# Patient Record
Sex: MALE | Race: WHITE | NOT HISPANIC OR LATINO | Employment: FULL TIME | URBAN - NONMETROPOLITAN AREA
[De-identification: names, ages, dates, MRNs, and addresses within clinical notes are randomized per-mention and may not be internally consistent; named-entity substitution may affect disease eponyms.]

---

## 2024-05-27 ENCOUNTER — HOSPITAL ENCOUNTER (EMERGENCY)
Facility: HOSPITAL | Age: 29
Discharge: PSYCHIATRIC OR SUBSTANCE USE FACILITY | End: 2024-05-28
Attending: EMERGENCY MEDICINE
Payer: COMMERCIAL

## 2024-05-27 DIAGNOSIS — F19.10 DRUG ABUSE (MULTI): ICD-10-CM

## 2024-05-27 DIAGNOSIS — F34.1 PERSISTENT DEPRESSIVE DISORDER: Primary | ICD-10-CM

## 2024-05-27 LAB
ALBUMIN SERPL BCP-MCNC: 4.8 G/DL (ref 3.4–5)
ALP SERPL-CCNC: 75 U/L (ref 33–120)
ALT SERPL W P-5'-P-CCNC: 15 U/L (ref 10–52)
AMPHETAMINES UR QL SCN: ABNORMAL
ANION GAP SERPL CALC-SCNC: 12 MMOL/L (ref 10–20)
APAP SERPL-MCNC: <10 UG/ML
APPEARANCE UR: ABNORMAL
AST SERPL W P-5'-P-CCNC: 14 U/L (ref 9–39)
BARBITURATES UR QL SCN: ABNORMAL
BASOPHILS # BLD AUTO: 0.03 X10*3/UL (ref 0–0.1)
BASOPHILS NFR BLD AUTO: 0.5 %
BENZODIAZ UR QL SCN: ABNORMAL
BILIRUB SERPL-MCNC: 0.2 MG/DL (ref 0–1.2)
BILIRUB UR STRIP.AUTO-MCNC: NEGATIVE MG/DL
BUN SERPL-MCNC: 14 MG/DL (ref 6–23)
BZE UR QL SCN: ABNORMAL
CALCIUM SERPL-MCNC: 9.6 MG/DL (ref 8.6–10.3)
CANNABINOIDS UR QL SCN: ABNORMAL
CHLORIDE SERPL-SCNC: 101 MMOL/L (ref 98–107)
CO2 SERPL-SCNC: 29 MMOL/L (ref 21–32)
COLOR UR: YELLOW
CREAT SERPL-MCNC: 1.19 MG/DL (ref 0.5–1.3)
EGFRCR SERPLBLD CKD-EPI 2021: 85 ML/MIN/1.73M*2
EOSINOPHIL # BLD AUTO: 0.08 X10*3/UL (ref 0–0.7)
EOSINOPHIL NFR BLD AUTO: 1.3 %
ERYTHROCYTE [DISTWIDTH] IN BLOOD BY AUTOMATED COUNT: 12.9 % (ref 11.5–14.5)
ETHANOL SERPL-MCNC: <10 MG/DL
FENTANYL+NORFENTANYL UR QL SCN: ABNORMAL
GLUCOSE SERPL-MCNC: 136 MG/DL (ref 74–99)
GLUCOSE UR STRIP.AUTO-MCNC: NEGATIVE MG/DL
HCT VFR BLD AUTO: 42.3 % (ref 41–52)
HGB BLD-MCNC: 14.1 G/DL (ref 13.5–17.5)
HOLD SPECIMEN: NORMAL
IMM GRANULOCYTES # BLD AUTO: 0.01 X10*3/UL (ref 0–0.7)
IMM GRANULOCYTES NFR BLD AUTO: 0.2 % (ref 0–0.9)
INR PPP: 1.1 (ref 0.9–1.1)
KETONES UR STRIP.AUTO-MCNC: NEGATIVE MG/DL
LACTATE SERPL-SCNC: 1.8 MMOL/L (ref 0.4–2)
LEUKOCYTE ESTERASE UR QL STRIP.AUTO: NEGATIVE
LYMPHOCYTES # BLD AUTO: 1.3 X10*3/UL (ref 1.2–4.8)
LYMPHOCYTES NFR BLD AUTO: 21.6 %
MCH RBC QN AUTO: 27.8 PG (ref 26–34)
MCHC RBC AUTO-ENTMCNC: 33.3 G/DL (ref 32–36)
MCV RBC AUTO: 83 FL (ref 80–100)
METHADONE UR QL SCN: ABNORMAL
MONOCYTES # BLD AUTO: 0.4 X10*3/UL (ref 0.1–1)
MONOCYTES NFR BLD AUTO: 6.6 %
NEUTROPHILS # BLD AUTO: 4.21 X10*3/UL (ref 1.2–7.7)
NEUTROPHILS NFR BLD AUTO: 69.8 %
NITRITE UR QL STRIP.AUTO: NEGATIVE
NRBC BLD-RTO: 0 /100 WBCS (ref 0–0)
OPIATES UR QL SCN: ABNORMAL
OXYCODONE+OXYMORPHONE UR QL SCN: ABNORMAL
PCP UR QL SCN: ABNORMAL
PH UR STRIP.AUTO: 5 [PH]
PLATELET # BLD AUTO: 229 X10*3/UL (ref 150–450)
POTASSIUM SERPL-SCNC: 3.8 MMOL/L (ref 3.5–5.3)
PROT SERPL-MCNC: 7.6 G/DL (ref 6.4–8.2)
PROT UR STRIP.AUTO-MCNC: NEGATIVE MG/DL
PROTHROMBIN TIME: 12.3 SECONDS (ref 9.8–12.8)
RBC # BLD AUTO: 5.08 X10*6/UL (ref 4.5–5.9)
RBC # UR STRIP.AUTO: NEGATIVE /UL
SALICYLATES SERPL-MCNC: <3 MG/DL
SARS-COV-2 RNA RESP QL NAA+PROBE: NOT DETECTED
SODIUM SERPL-SCNC: 138 MMOL/L (ref 136–145)
SP GR UR STRIP.AUTO: 1.03
UROBILINOGEN UR STRIP.AUTO-MCNC: 2 MG/DL
WBC # BLD AUTO: 6 X10*3/UL (ref 4.4–11.3)

## 2024-05-27 PROCEDURE — 80179 DRUG ASSAY SALICYLATE: CPT | Performed by: EMERGENCY MEDICINE

## 2024-05-27 PROCEDURE — 85610 PROTHROMBIN TIME: CPT | Performed by: EMERGENCY MEDICINE

## 2024-05-27 PROCEDURE — 82077 ASSAY SPEC XCP UR&BREATH IA: CPT | Performed by: EMERGENCY MEDICINE

## 2024-05-27 PROCEDURE — 80053 COMPREHEN METABOLIC PANEL: CPT | Performed by: EMERGENCY MEDICINE

## 2024-05-27 PROCEDURE — 80307 DRUG TEST PRSMV CHEM ANLYZR: CPT | Performed by: EMERGENCY MEDICINE

## 2024-05-27 PROCEDURE — 83605 ASSAY OF LACTIC ACID: CPT | Performed by: EMERGENCY MEDICINE

## 2024-05-27 PROCEDURE — 87635 SARS-COV-2 COVID-19 AMP PRB: CPT | Performed by: EMERGENCY MEDICINE

## 2024-05-27 PROCEDURE — 93005 ELECTROCARDIOGRAM TRACING: CPT

## 2024-05-27 PROCEDURE — 85025 COMPLETE CBC W/AUTO DIFF WBC: CPT | Performed by: EMERGENCY MEDICINE

## 2024-05-27 PROCEDURE — 99285 EMERGENCY DEPT VISIT HI MDM: CPT

## 2024-05-27 PROCEDURE — 81003 URINALYSIS AUTO W/O SCOPE: CPT | Mod: 59 | Performed by: EMERGENCY MEDICINE

## 2024-05-27 PROCEDURE — 80143 DRUG ASSAY ACETAMINOPHEN: CPT | Performed by: EMERGENCY MEDICINE

## 2024-05-27 PROCEDURE — 36415 COLL VENOUS BLD VENIPUNCTURE: CPT | Performed by: EMERGENCY MEDICINE

## 2024-05-27 SDOH — HEALTH STABILITY: MENTAL HEALTH: HAVE YOU EVER TRIED TO KILL YOURSELF?: NO

## 2024-05-27 SDOH — HEALTH STABILITY: MENTAL HEALTH: HAVE YOU EVER DONE ANYTHING, STARTED TO DO ANYTHING, OR PREPARED TO DO ANYTHING TO END YOUR LIFE?: NO

## 2024-05-27 SDOH — HEALTH STABILITY: MENTAL HEALTH: IN THE PAST FEW WEEKS, HAVE YOU FELT THAT YOU OR YOUR FAMILY WOULD BE BETTER OFF IF YOU WERE DEAD?: YES

## 2024-05-27 SDOH — HEALTH STABILITY: MENTAL HEALTH: WISH TO BE DEAD (PAST 1 MONTH): YES

## 2024-05-27 SDOH — HEALTH STABILITY: MENTAL HEALTH: ACTIVE SUICIDAL IDEATION WITH SOME INTENT TO ACT, WITHOUT SPECIFIC PLAN (PAST 1 MONTH): NO

## 2024-05-27 SDOH — HEALTH STABILITY: MENTAL HEALTH: HAVE YOU WISHED YOU WERE DEAD OR WISHED YOU COULD GO TO SLEEP AND NOT WAKE UP?: NO

## 2024-05-27 SDOH — HEALTH STABILITY: MENTAL HEALTH: IN THE PAST WEEK, HAVE YOU BEEN HAVING THOUGHTS ABOUT KILLING YOURSELF?: YES

## 2024-05-27 SDOH — HEALTH STABILITY: MENTAL HEALTH: SUICIDAL BEHAVIOR (LIFETIME): NO

## 2024-05-27 SDOH — HEALTH STABILITY: MENTAL HEALTH: ACTIVE SUICIDAL IDEATION WITH SPECIFIC PLAN AND INTENT (PAST 1 MONTH): NO

## 2024-05-27 SDOH — HEALTH STABILITY: MENTAL HEALTH: IN THE PAST FEW WEEKS, HAVE YOU WISHED YOU WERE DEAD?: YES

## 2024-05-27 SDOH — HEALTH STABILITY: MENTAL HEALTH: HAVE YOU ACTUALLY HAD ANY THOUGHTS OF KILLING YOURSELF?: NO

## 2024-05-27 SDOH — HEALTH STABILITY: MENTAL HEALTH
DEPRESSION SYMPTOMS: APPETITE CHANGE;FEELINGS OF HELPLESSNESS;FEELINGS OF HOPELESSESS;FEELINGS OF WORTHLESSNESS;LOSS OF INTEREST;ISOLATIVE

## 2024-05-27 SDOH — ECONOMIC STABILITY: HOUSING INSECURITY: FEELS SAFE LIVING IN HOME: YES

## 2024-05-27 SDOH — HEALTH STABILITY: MENTAL HEALTH: NON-SPECIFIC ACTIVE SUICIDAL THOUGHTS (PAST 1 MONTH): YES

## 2024-05-27 SDOH — HEALTH STABILITY: MENTAL HEALTH: ANXIETY SYMPTOMS: NO PROBLEMS REPORTED OR OBSERVED.

## 2024-05-27 SDOH — HEALTH STABILITY: MENTAL HEALTH: BEHAVIORS/MOOD: WITHDRAWN

## 2024-05-27 SDOH — HEALTH STABILITY: MENTAL HEALTH: DESCRIBE YOUR THOUGHTS OF KILLING YOURSELF RIGHT NOW:: JUST HAVING THOUGHTS BUT NO PLAN OR INTENT

## 2024-05-27 SDOH — HEALTH STABILITY: MENTAL HEALTH: SUICIDE ASSESSMENT: ADULT (C-SSRS)

## 2024-05-27 SDOH — HEALTH STABILITY: MENTAL HEALTH: ARE YOU HAVING THOUGHTS OF KILLING YOURSELF RIGHT NOW?: YES

## 2024-05-27 ASSESSMENT — LIFESTYLE VARIABLES
SUBSTANCE_ABUSE_PAST_12_MONTHS: YES
PRESCIPTION_ABUSE_PAST_12_MONTHS: NO

## 2024-05-27 ASSESSMENT — PAIN - FUNCTIONAL ASSESSMENT: PAIN_FUNCTIONAL_ASSESSMENT: 0-10

## 2024-05-27 ASSESSMENT — PAIN SCALES - GENERAL: PAINLEVEL_OUTOF10: 0 - NO PAIN

## 2024-05-27 ASSESSMENT — COLUMBIA-SUICIDE SEVERITY RATING SCALE - C-SSRS
2. HAVE YOU ACTUALLY HAD ANY THOUGHTS OF KILLING YOURSELF?: NO
6. HAVE YOU EVER DONE ANYTHING, STARTED TO DO ANYTHING, OR PREPARED TO DO ANYTHING TO END YOUR LIFE?: NO
1. IN THE PAST MONTH, HAVE YOU WISHED YOU WERE DEAD OR WISHED YOU COULD GO TO SLEEP AND NOT WAKE UP?: NO

## 2024-05-27 NOTE — ED PROVIDER NOTES
" [facility]   ED  Provider Note  5/27/2024  7:22 PM  AC09/AC09      Chief Complaint   Patient presents with    Suicidal    Psychiatric Evaluation     Pt to ED for girlfriend's concern that he will hurt himself. Pt withdrawn on arrival, states he has been \"in a dark place.\" Denies SI/HI. States hx of self harm cutting at age of 11. Hx of street drug use. Past heroine use. Recently relapsed on meth about 1 week ago. Pt has 9 month son at home hat he \"wants to be better or' along with a girlfriend that he feel like he hs let down. States his depressive episode has gotten worse over the last year. Pt states \" I sometimes feel like people would be better         History of Present Illness:   Inna Sal is a 28 y.o. male presenting to the ED for pression, beginning several months ago and recurrent throughout his teenage and adult life.  The complaint has been recently persistent, moderate in severity, and worsened by a poor relationship with his girlfriend.  Patient began cutting his forearms when he was 10 years old.  He has had multiple bouts of depression since.  He has been quite depressed over the past month or longer.  He does use methamphetamine and has been on heroin in the remote past.  His last methamphetamine use was 3 days ago.  He denies alcohol abuse.  He denies any fixed suicide plan at this time.  He has thought about killing himself.  He has not seen a counselor or psychiatrist in many years.      Review of Systems:   Pertinent positives and review of systems as noted above.  Remaining 10 review of systems is negative or noncontributory to today's episode of care.  Review of Systems       --------------------------------------------- PAST HISTORY ---------------------------------------------  Past Medical History: History reviewed. No pertinent past medical history.     Past Surgical History: History reviewed. No pertinent surgical history.     Social History:   Social History     Social History " Narrative    Not on file        Family History: family history is not on file. Unless otherwise noted, family history is non contributory    There are no discharge medications for this patient.     The patient’s home medications have been reviewed.    Allergies: Patient has no known allergies.    -------------------------------------------------- RESULTS -------------------------------------------------  All laboratory and radiology results have been personally reviewed by myself   LABS:  Labs Reviewed   DRUG SCREEN,URINE - Abnormal       Result Value    Amphetamine Screen, Urine Presumptive Positive (*)     Barbiturate Screen, Urine Presumptive Negative      Benzodiazepines Screen, Urine Presumptive Negative      Cannabinoid Screen, Urine Presumptive Negative      Cocaine Metabolite Screen, Urine Presumptive Negative      Fentanyl Screen, Urine Presumptive Negative      Opiate Screen, Urine Presumptive Negative      Oxycodone Screen, Urine Presumptive Negative      PCP Screen, Urine Presumptive Negative      Methadone Screen, Urine Presumptive Negative      Narrative:     Drug screen results are presumptive and should not be used to assess   compliance with prescribed medication. Contact the performing [facility] laboratory   to add-on definitive confirmatory testing if clinically indicated.    Toxicology screening results are reported qualitatively. The concentration must   be greater than or equal to the cutoff to be reported as positive. The concentration   at which the screening test can detect an individual drug or metabolite varies.   The absence of expected drug(s) and/or drug metabolite(s) may indicate non-compliance,   inappropriate timing of specimen collection relative to drug administration, poor drug   absorption, diluted/adulterated urine, or limitations of testing. For medical purposes   only; not valid for forensic use.    Interpretive questions should be directed to the laboratory medical directors.    COMPREHENSIVE METABOLIC PANEL - Abnormal    Glucose 136 (*)     Sodium 138      Potassium 3.8      Chloride 101      Bicarbonate 29      Anion Gap 12      Urea Nitrogen 14      Creatinine 1.19      eGFR 85      Calcium 9.6      Albumin 4.8      Alkaline Phosphatase 75      Total Protein 7.6      AST 14      Bilirubin, Total 0.2      ALT 15     URINALYSIS WITH REFLEX MICROSCOPIC - Abnormal    Color, Urine Yellow      Appearance, Urine Hazy (*)     Specific Gravity, Urine 1.029      pH, Urine 5.0      Protein, Urine NEGATIVE      Glucose, Urine NEGATIVE      Blood, Urine NEGATIVE      Ketones, Urine NEGATIVE      Bilirubin, Urine NEGATIVE      Urobilinogen, Urine 2.0 (*)     Nitrite, Urine NEGATIVE      Leukocyte Esterase, Urine NEGATIVE     ALCOHOL - Normal    Alcohol <10     PROTIME-INR - Normal    Protime 12.3      INR 1.1     LACTATE - Normal    Lactate 1.8      Narrative:     Venipuncture immediately after or during the administration of Metamizole may lead to falsely low results. Testing should be performed immediately  prior to Metamizole dosing.   SARS-COV-2 PCR - Normal    Coronavirus 2019, PCR Not Detected      Narrative:     This assay has received FDA Emergency Use Authorization (EUA) and is only authorized for the duration of time that circumstances exist to justify the authorization of the emergency use of in vitro diagnostic tests for the detection of SARS-CoV-2 virus and/or diagnosis of COVID-19 infection under section 564(b)(1) of the Act, 21 U.S.C. 360bbb-3(b)(1). This assay is an in vitro diagnostic nucleic acid amplification test for the qualitative detection of SARS-CoV-2 from nasopharyngeal specimens and has been validated for use at [facility]. Negative results do not preclude COVID-19 infections and should not be used as the sole basis for diagnosis, treatment, or other management decisions.     SALICYLATE - Normal    Salicylate  <3     ACETAMINOPHEN - Normal     "Acetaminophen <10.0     CBC WITH AUTO DIFFERENTIAL    WBC 6.0      nRBC 0.0      RBC 5.08      Hemoglobin 14.1      Hematocrit 42.3      MCV 83      MCH 27.8      MCHC 33.3      RDW 12.9      Platelets 229      Neutrophils % 69.8      Immature Granulocytes %, Automated 0.2      Lymphocytes % 21.6      Monocytes % 6.6      Eosinophils % 1.3      Basophils % 0.5      Neutrophils Absolute 4.21      Immature Granulocytes Absolute, Automated 0.01      Lymphocytes Absolute 1.30      Monocytes Absolute 0.40      Eosinophils Absolute 0.08      Basophils Absolute 0.03       EKG:    RADIOLOGY:  Interpreted by Radiologist.  No orders to display       Encounter Date: 05/27/24   ECG 12 Lead   Result Value    Ventricular Rate 107    Atrial Rate 107    SC Interval 156    QRS Duration 98    QT Interval 340    QTC Calculation(Bazett) 453    P Axis 75    R Axis 23    T Axis 46    QRS Count 18    Q Onset 219    P Onset 141    P Offset 200    T Offset 389    QTC Fredericia 412    Narrative    Sinus tachycardia  Possible Left atrial enlargement  Incomplete right bundle branch block  Borderline ECG  When compared with ECG of 01-AUG-1995 14:54,  PREVIOUS ECG IS PRESENT  See ED provider note for full interpretation and clinical correlation  Confirmed by Sona Sanchez ([id number]) on 5/28/2024 11:18:07 AM     ------------------------- NURSING NOTES AND VITALS REVIEWED ---------------------------   The nursing notes within the ED encounter and vital signs as below have been reviewed.   /85   Pulse 98   Temp 37 °C (98.6 °F)   Resp 18   Ht 1.676 m (5' 6\")   Wt 74.8 kg (165 lb)   SpO2 100%   BMI 26.63 kg/m²   Oxygen Saturation Interpretation: Normal      ---------------------------------------------------PHYSICAL EXAM--------------------------------------  Physical Exam   Constitutional/General: Alert,  well appearing, non toxic in NAD  Head: Normocephalic and atraumatic  Eyes: PERRL, EOMI, conjunctiva normal, sclera non icteric  Mouth: " Oropharynx clear, handling secretions, no trismus, no asymmetry of the posterior oropharynx or uvular edema  Neck: Supple, full ROM, non tender to palpation in the midline, no stridor, no crepitus, no meningeal signs  Respiratory: Lungs clear to auscultation bilaterally, no wheezes, rales, or rhonchi. Not in respiratory distress  Cardiovascular:  Regular rate. Regular rhythm. No murmurs, gallops, or rubs. 2+ distal pulses  Chest: No chest wall tenderness  GI:  Abdomen Soft, Non tender, Non distended.  +BS. No organomegaly, no palpable masses,  No rebound, guarding, or rigidity.   Musculoskeletal: Moves all extremities x 4. Warm and well perfused, no clubbing, cyanosis, or edema. Capillary refill <3 seconds  Integument: skin warm and dry. No rashes.  Old scars from remote self injury to his forearms  Lymphatic: no lymphadenopathy noted  Neurologic: No focal deficits, symmetric strength 5/5 in the upper and lower extremities bilaterally  Psychiatric: Normal Affect    Procedures    ------------------------------ ED COURSE/MEDICAL DECISION MAKING----------------------  ED Course as of 05/30/24 1048   Mon May 27, 2024   2019 EKG done at 2013 which I reviewed and independently interpreted reveals a sinus tachycardia rate of 107 with incomplete right bundle branch block, normal axis, normal ST and T waves, normal QT/QTc.  No STEMI. [RM]      ED Course User Index  [RM] Bryce Nixon MD         Diagnoses as of 05/30/24 1048   Persistent depressive disorder   Drug abuse (Multi)      Patient require medical clearance and EPAT evaluation possible placement versus outpatient counseling.      Medical Decision Making:   Patient sent to Dr. Nixon pending medical clearance and psychiatric evaluation.    ED Course as of 05/30/24 1048   Mon May 27, 2024   2019 EKG done at 2013 which I reviewed and independently interpreted reveals a sinus tachycardia rate of 107 with incomplete right bundle branch block, normal axis, normal ST  and T waves, normal QT/QTc.  No STEMI. [RM]      ED Course User Index  [RM] Bryce Nixon MD         Diagnoses as of 05/30/24 1048   Persistent depressive disorder   Drug abuse (Multi)      Counseling:   The emergency provider has spoken with the patient and discussed today’s results, in addition to providing specific details for the plan of care and counseling regarding the diagnosis and prognosis.  Questions are answered at this time and they are agreeable with the plan.      --------------------------------- IMPRESSION AND DISPOSITION ---------------------------------        IMPRESSION  1. Persistent depressive disorder    2. Drug abuse (Multi)        DISPOSITION  Disposition: See ED record  Patient condition is fair      Billing Provider Critical Care Time: 0 minutes     Agustin Robles MD  05/27/24 1959       Agustin Robles MD  05/30/24 1048

## 2024-05-28 ENCOUNTER — APPOINTMENT (OUTPATIENT)
Dept: CARDIOLOGY | Facility: HOSPITAL | Age: 29
End: 2024-05-28
Payer: COMMERCIAL

## 2024-05-28 VITALS
TEMPERATURE: 98.6 F | DIASTOLIC BLOOD PRESSURE: 85 MMHG | HEIGHT: 66 IN | BODY MASS INDEX: 26.52 KG/M2 | WEIGHT: 165 LBS | RESPIRATION RATE: 18 BRPM | OXYGEN SATURATION: 100 % | SYSTOLIC BLOOD PRESSURE: 136 MMHG | HEART RATE: 98 BPM

## 2024-05-28 LAB
ATRIAL RATE: 107 BPM
P AXIS: 75 DEGREES
P OFFSET: 200 MS
P ONSET: 141 MS
PR INTERVAL: 156 MS
Q ONSET: 219 MS
QRS COUNT: 18 BEATS
QRS DURATION: 98 MS
QT INTERVAL: 340 MS
QTC CALCULATION(BAZETT): 453 MS
QTC FREDERICIA: 412 MS
R AXIS: 23 DEGREES
T AXIS: 46 DEGREES
T OFFSET: 389 MS
VENTRICULAR RATE: 107 BPM

## 2024-05-28 NOTE — SIGNIFICANT EVENT
Application for Emergency Admission      Ready for Transfer?  Is the patient medically cleared for transfer to inpatient psychiatry: Yes  Has the patient been accepted to an inpatient psychiatric hospital: Yes    Application for Emergency Admission  IN ACCORDANCE [place]122[place]  The Chief Clinical Officer of: [place] 5/28/2024 .2:35 AM    Reason for Hospitalization  The undersigned has reason to believe that: Inna Sal Is a mentally ill person subject to hospitalization by court order under division B Section 5122.01 of the Revised Code, i.e., this person:    1.Yes  Represents a substantial risk of physical harm to self as manifested by evidence of threats of, or attempts at, suicide or serious self-inflicted bodily harm    2.No Represents a substantial risk of physical harm to others as manifested by evidence of recent homicidal or other violent behavior, evidence of recent threats that place another in reasonable fear of violent behavior and serious physical harm, or other evidence of present dangerousness    3.No Represents a substantial and immediate risk of serious physical impairment or injury to self as manifested by  evidence that the person is unable to provide for and is not providing for the person's basic physical needs because of the person's mental illness and that appropriate provision for those needs cannot be made  immediately available in the community    4.Yes Would benefit from treatment in a hospital for his mental illness and is in need of such treatment as manifested by evidence of behavior that creates a grave and imminent risk to substantial rights of others or  himself.    5.No Would benefit from treatment as manifested by evidence of behavior that indicates all of the following:       (a) The person is unlikely to survive safely in the community without supervision, based on a clinical determination.       (b) The person has a history of lack of compliance with  treatment for mental illness and one of the following applies:      (i) At least twice within the thirty-six months prior to the filing of an affidavit seeking court-ordered treatment of the person under section 5122.111 of the Revised Code, the lack of compliance has been a significant factor in necessitating hospitalization in a hospital or receipt of services in a forensic or other mental health unit of a correctional facility, provided that the thirty-six-month period shall be extended by the length of any hospitalization or incarceration of the person that occurred within the thirty-six-month period.      (ii) Within the forty-eight months prior to the filing of an affidavit seeking court-ordered treatment of the person under section 5122.111 of the Revised Code, the lack of compliance resulted in one or more acts of serious violent behavior toward self or others or threats of, or attempts at, serious physical harm to self or others, provided that the forty-eight-month period shall be extended by the length of any hospitalization or incarceration of the person that occurred within the forty-eight-month period.      (c) The person, as a result of mental illness, is unlikely to voluntarily participate in necessary treatment.       (d) In view of the person's treatment history and current behavior, the person is in need of treatment in order to prevent a relapse or deterioration that would be likely to result in substantial risk of serious harm to the person or others.    (e) Represents a substantial risk of physical harm to self or others if allowed to remain at liberty pending examination.    Therefore, it is requested that said person be admitted to the above named facility.    STATEMENT OF BELIEF    Must be filled out by one of the following: a psychiatrist, licensed physician, licensed clinical psychologist, health or ,  or .  (Statement shall include the circumstances under  which the individual was taken into custody and the reason for the person's belief that hospitalization is necessary. The statement shall also include a reference to efforts made to secure the individual's property at his residence if he was taken into custody there. Every reasonable and appropriate effort should be made to take this person into custody in the least conspicuous manner possible.)    After argument with girlfriend patient threatened to hang himself.  Girlfriend called police who found a bucket in the bedroom that looked like someone had stepped on and broken.  Family concerned of patient's increased depression and deteriorating condition.  The patient's behavior is concerning for imminent risk of self-harm.    Bryce Nixon MD 5/28/2024     _____________________________________________________________   Place of Employment: [facility]    STATEMENT OF OBSERVATION BY PSYCHIATRIST, LICENSED PHYSICIAN, OR LICENSED CLINICAL PSYCHOLOGIST, IF APPLICABLE    Place of Observation (e.g., [facility] mental health center, general hospital, office, emergency facility)  (If applicable, please complete)    Bryce Nixon MD 5/28/2024    _____________________________________________________________

## 2024-05-28 NOTE — PROGRESS NOTES
"Emergency Medicine Transition of Care Note.    I received Inna Sal in signout from Dr. Robles.  Please see the previous ED provider note for all HPI, PE and MDM up to the time of signout at 8 PM. This is in addition to the primary record.    In brief Inna Sal is an 28 y.o. male presenting for increased depression.  Chief Complaint   Patient presents with    Suicidal    Psychiatric Evaluation     Pt to ED for girlfriend's concern that he will hurt himself. Pt withdrawn on arrival, states he has been \"in a dark place.\" Denies SI/HI. States hx of self harm cutting at age of 11. Hx of street drug use. Past heroine use. Recently relapsed on meth about 1 week ago. Pt has 9 month son at home hat he \"wants to be better or' along with a girlfriend that he feel like he hs let down. States his depressive episode has gotten worse over the last year. Pt states \" I sometimes feel like people would be better      At the time of signout we were awaiting: Blood work and urine.    ED Course as of 05/27/24 2132   Mon May 27, 2024   2019 EKG done at 2013 which I reviewed and independently interpreted reveals a sinus tachycardia rate of 107 with incomplete right bundle branch block, normal axis, normal ST and T waves, normal QT/QTc.  No STEMI. [RM]      ED Course User Index  [RM] Bryce Nixon MD         Diagnoses as of 05/27/24 2132   Persistent depressive disorder   Drug abuse (Multi)       Medical Decision Making    Labs Reviewed   DRUG SCREEN,URINE - Abnormal       Result Value    Amphetamine Screen, Urine Presumptive Positive (*)     Barbiturate Screen, Urine Presumptive Negative      Benzodiazepines Screen, Urine Presumptive Negative      Cannabinoid Screen, Urine Presumptive Negative      Cocaine Metabolite Screen, Urine Presumptive Negative      Fentanyl Screen, Urine Presumptive Negative      Opiate Screen, Urine Presumptive Negative      Oxycodone Screen, Urine Presumptive Negative      PCP Screen, Urine " Presumptive Negative      Methadone Screen, Urine Presumptive Negative      Narrative:     Drug screen results are presumptive and should not be used to assess   compliance with prescribed medication. Contact the performing [facility] laboratory   to add-on definitive confirmatory testing if clinically indicated.    Toxicology screening results are reported qualitatively. The concentration must   be greater than or equal to the cutoff to be reported as positive. The concentration   at which the screening test can detect an individual drug or metabolite varies.   The absence of expected drug(s) and/or drug metabolite(s) may indicate non-compliance,   inappropriate timing of specimen collection relative to drug administration, poor drug   absorption, diluted/adulterated urine, or limitations of testing. For medical purposes   only; not valid for forensic use.    Interpretive questions should be directed to the laboratory medical directors.   COMPREHENSIVE METABOLIC PANEL - Abnormal    Glucose 136 (*)     Sodium 138      Potassium 3.8      Chloride 101      Bicarbonate 29      Anion Gap 12      Urea Nitrogen 14      Creatinine 1.19      eGFR 85      Calcium 9.6      Albumin 4.8      Alkaline Phosphatase 75      Total Protein 7.6      AST 14      Bilirubin, Total 0.2      ALT 15     URINALYSIS WITH REFLEX MICROSCOPIC - Abnormal    Color, Urine Yellow      Appearance, Urine Hazy (*)     Specific Gravity, Urine 1.029      pH, Urine 5.0      Protein, Urine NEGATIVE      Glucose, Urine NEGATIVE      Blood, Urine NEGATIVE      Ketones, Urine NEGATIVE      Bilirubin, Urine NEGATIVE      Urobilinogen, Urine 2.0 (*)     Nitrite, Urine NEGATIVE      Leukocyte Esterase, Urine NEGATIVE     ALCOHOL - Normal    Alcohol <10     PROTIME-INR - Normal    Protime 12.3      INR 1.1     LACTATE - Normal    Lactate 1.8      Narrative:     Venipuncture immediately after or during the administration of Metamizole may lead to falsely low results.  Testing should be performed immediately  prior to Metamizole dosing.   SARS-COV-2 PCR - Normal    Coronavirus 2019, PCR Not Detected      Narrative:     This assay has received FDA Emergency Use Authorization (EUA) and is only authorized for the duration of time that circumstances exist to justify the authorization of the emergency use of in vitro diagnostic tests for the detection of SARS-CoV-2 virus and/or diagnosis of COVID-19 infection under section 564(b)(1) of the Act, 21 U.S.C. 360bbb-3(b)(1). This assay is an in vitro diagnostic nucleic acid amplification test for the qualitative detection of SARS-CoV-2 from nasopharyngeal specimens and has been validated for use at . Negative results do not preclude COVID-19 infections and should not be used as the sole basis for diagnosis, treatment, or other management decisions.     SALICYLATE - Normal    Salicylate  <3     ACETAMINOPHEN - Normal    Acetaminophen <10.0     CBC WITH AUTO DIFFERENTIAL    WBC 6.0      nRBC 0.0      RBC 5.08      Hemoglobin 14.1      Hematocrit 42.3      MCV 83      MCH 27.8      MCHC 33.3      RDW 12.9      Platelets 229      Neutrophils % 69.8      Immature Granulocytes %, Automated 0.2      Lymphocytes % 21.6      Monocytes % 6.6      Eosinophils % 1.3      Basophils % 0.5      Neutrophils Absolute 4.21      Immature Granulocytes Absolute, Automated 0.01      Lymphocytes Absolute 1.30      Monocytes Absolute 0.40      Eosinophils Absolute 0.08      Basophils Absolute 0.03       No orders to display     Patient presents to the emergency department for evaluation for increased depression.  Drawstring positive for amphetamines.  Alcohol level is undetectable.  Negative Tylenol and aspirin levels.  Rest of blood work is all within reasonable range including electrolytes and CBC.  I reviewed and independently interpreted all blood work.  Patient is medically cleared for EPAT evaluation.  Patient has been  evaluated by YOU.  I had spoken with the patient.  He denies any suicidal or homicidal thoughts at present time and wants to leave.  I have reviewed YOU's note.  I have reviewed their conversation with the family members that served as collateral.  Everyone is concerned that the patient's condition has been declining and he is a risk to himself.  There was a broken bucket found in the bedroom that look like someone had tried to stand on and that further raises concern that he did try to stand on the bucket may be too try to hang himself.  The patient will be pink slipped.  Recommendation from YOU for admission.  The patient will be held in the emergency department for admission.  Patient has been accepted to [facility] by hospitalist Kathy Suh  Final diagnoses:   [F34.1] Persistent depressive disorder   [F19.10] Drug abuse (Multi)           Procedure  Procedures    Bryce Nixon MD

## 2024-05-28 NOTE — PROGRESS NOTES
EPAT - Social Work Psychiatric Assessment    Arrival Details  Mode of Arrival: Ambulance  Admission Source: Emergency department  Admission Type: Voluntary, Involuntary  EPAT Assessment Start Date: 05/27/24  EPAT Assessment Start Time: 2240  Name of : Marlene Lopez M.Ed., [facility]    History of Present Illness  Admission Reason: Suicidal Ideation    The patient is a 28 yr old male with a history of depression and severe polysubstance use disorder (methamphetamine/jessica meth and opioids/heroin). The patient presents in the ED with concerns of suicidal ideation and recent relapse on methamphetamine. According to ED notes, the patient's depression has worsened due to a poor relationship with his girlfriend. He recently relapsed on methamphetamine and last use was 3 days ago. He endorses suicidal ideations but has no plan or intent of acting on them. The patient scores low risk for suicide on the C-SSRS. He has not seen any psychiatrist or counselor in many years. The patient has a 9 month old son at home and “wants to be better”.    Patient history was reviewed before EPAT evaluation.     Psychiatric Diagnosis History: depression and severe polysubstance use disorder (methamphetamine/Jessica meth and opioids/heroin)    Psychiatric Medication/Treatment History: Patient has not had any psychiatric treatment in over 5+ years.    SW Readmission Information   Readmission within 30 Days: No    Psychiatric Symptoms  Anxiety Symptoms: No problems reported or observed.  Depression Symptoms: Appetite change, Feelings of helplessness, Feelings of hopelessess, Feelings of worthlessness, Loss of interest, Isolative  Sarah Symptoms: No problems reported or observed.    Psychosis Symptoms  Hallucination Type: No problems reported or observed.  Delusion Type: No problems reported or observed.    Additional Symptoms - Adult  Generalized Anxiety Disorder: No problems reported or observed.  Obsessive Compulsive Disorder: No  "problems reported or observed.  Panic Attack: No problems reported or observed.  Post Traumatic Stress Disorder: No problems reported or observed.  Delirium: No problems reported or observed.  Review of Symptoms Comments: The patient reports that within the past year his depressive symptoms have been worsening due to a stressed/ariella relationship with his girlfriend. He reports feeling like has been on \"autopilot\" and nothing is getting better. He has been having suicidal thoughts and told his girlfriend he was going to hang himself but is denying any plan/intent. He has a 9 month old son with his girlfriend. He recently relapsed after being sober for a few years. He has not had any treatment in years.    Past Psychiatric History/Meds/Treatments  Past Psychiatric History: depression and severe polysubstance use disorder (methamphetamine/ciro meth and opioids/heroin)  Past Psychiatric Meds/Treatments: None. Patient has [facility] substance use treatment [facility] and [facility]  Past Violence/Victimization History: None reported    Current Mental Health Contacts  Provider Name/Phone Number: Fransisco España MD  Provider Last Appointment Date: Unknown    Support System: Immediate family, Extended family, Friends, Community    Living Arrangement: House, Lives with someone (Lives with girlfriend and son)    Home Safety  Feels Safe Living in Home: Yes    Income Information  Employment Status for: Patient  Employment Status: Employed  Income Source: Employed  Current/Previous Occupation: Service Industry (Maintenance)    [place] Service/Education History  Current or Previous  Service: None    Social/Cultural History  Social History: 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  Cultural Requests During Hospitalization: None  Spiritual Requests During Hospitalization: None  Important Activities: Family, Social    Legal  Legal Considerations:  (None reported)  Assistance with Managing/Advocating " Healthcare Needs:  (None reported)  Criminal Activity/ Legal Involvement Pertinent to Current Situation/ Hospitalization: None  Legal Concerns: None  Legal Comments: None    Drug Screening  Have you used any substances (canabis, cocaine, heroin, hallucinogens, inhalants, etc.) in the past 12 months?: Yes (Methamphetamine)  Have you used any prescription drugs other than prescribed in the past 12 months?: No  Is a toxicology screen needed?: Yes    Stage of Change  Stage of Change: Precontemplation  History of Treatment: Inpatient, AA/NA meetings  Type of Treatment Offered: Other (Comment) ([facility])  Treatment Offered: Declined  Duration of Substance Use: Patient has had about 3 relapses over the years  Frequency of Substance Use: Unknown  Age of First Substance Use: Unknown    Psychosocial  Psychosocial (WDL): Exceptions to WDL  Behaviors/Mood: Cooperative, Guarded, Withdrawn  Affect: Appropriate to circumstances  Parent/Guardian/Significant Other Involvement: No involvement  Family Behaviors: Appropriate for situation  Visitor Behaviors: Appropriate for situation, Supportive  Needs Expressed: Emotional  Emotional Support Given: Patient counseling    Orientation  Orientation Level: Oriented X4    General Appearance  Motor Activity: Unremarkable  Speech Pattern:  (Unremarkable)  General Attitude: Cooperative, Guarded, Withdrawn  Appearance/Hygiene: Unremarkable    Thought Process  Coherency:  (Patient is coherent)  Content: Unremarkable  Delusions:  (No delusions reported or observed)  Perception: Not altered  Hallucination: None  Judgment/Insight: Limited  Confusion: None  Cognition: Follows commands    Sleep Pattern  Sleep Pattern: Sleeps all night    Risk Factors  Self Harm/Suicidal Ideation Plan: Patient denies but told girlfriend he was going to hang himself.  Previous Self Harm/Suicidal Plans: Patient denies  Risk Factors: Substance abuse  Description of Thoughts/Ideas Leaving Unit Now: Unable to  assess    Violence Risk Assessment  Assessment of Violence: On admission  Thoughts of Harm to Others: No    Ability to Assess Risk Screen  Risk Screen - Ability to Assess: Able to be screened  Ask Suicide-Screening Questions  1. In the past few weeks, have you wished you were dead?: Yes  2. In the past few weeks, have you felt that you or your family would be better off if you were dead?: Yes  3. In the past week, have you been having thoughts about killing yourself?: Yes  4. Have you ever tried to kill yourself?: No  5. Are you having thoughts of killing yourself right now?: Yes  Describe your thoughts of killing yourself right now:: Just having thoughts but no plan or intent  Calculated Risk Score: Imminent Risk  [place] Suicide Severity Rating Scale (Screener/Recent Self-Report)  1. Wish to be Dead (Past 1 Month): Yes  2. Non-Specific Active Suicidal Thoughts (Past 1 Month): Yes  3. Active Suicidal Ideation with any Methods (Not Plan) Without Intent to Act (Past 1 Month): Yes  4. Active Suicidal Ideation with Some Intent to Act, Without Specific Plan (Past 1 Month): No  5. Active Suicidal Ideation with Specific Plan and Intent (Past 1 Month): No  6. Suicidal Behavior (Lifetime): No  Calculated C-SSRS Risk Score (Lifetime/Recent): Moderate Risk  Step 1: Risk Factors  Current & Past Psychiatric Dx: Other (Comment) (Untreated depression)  Presenting Symptoms: Anhedonia, Impulsivity, Hopelessness or despair  Family History: Other (Comment) (Brother has substance use issues also)  Precipitants/Stressors: Triggering events leading to humiliation, shame, and/or despair (e.g. loss of relationship, financial or health status) (real or anticipated), Substance intoxication or withdrawal, Perceived burden on others  Change in Treatment: Non-compliant or not receiving treatment  Access to Lethal Methods : No  Step 2: Protective Factors   Protective Factors Internal: Identifies reasons for living  Protective Factors External:  Responsibility to children, Supportive social network or family or friends, Engaged in work or school  Step 3: Suicidal Ideation Intensity  Most Severe Suicidal Ideation Identified: Patient claims he's only having thoughts but did tell girlfriend he was going to hang himself. (Brother reports a broken bucket was found upstairs and they don't know if he tried standing on it in an attempt to hang himself.)  How Many Times Have You Had These Thoughts: 2-5 times in a week  When You Have the Thoughts How Long do They Last : 1-4 hours/a lot of the time  Could/Can You Stop Thinking About Killing Yourself or Wanting to Die if You Want to: Can control thoughts with a lot of difficulty  Are There Things - Anyone or Anything - That Stopped You From Wanting to Die or Acting on: Uncertain that deterrents stopped you  What Sort of Reasons Did You Have For Thinking About Wanting to Die or Killing Yourself: Equally to get attention, revenge or a reaction from others and to end/stop the pain  Total Score: 16  Step 5: Documentation  Risk Level: Moderate suicide risk    Psychiatric Impression and Plan of Care    The patient is a 28 yr old male with a history of depression and severe polysubstance use disorder (methamphetamine/ciro meth and opioids/heroin). The patient presents for a psychiatric evaluation with concerns of suicidal ideation and recent relapse on methamphetamine. The patient appears cooperative but also withdrawn and guarded.     He endorses that he relapsed on methamphetamine a few days ago. [facility] use treatment [facility], he declined and said, “No. I'll be fine. I might go to an AA/NA meeting.”     The patient denies any HI/AH/VH. The patient endorses suicidal thoughts but has no plan or intent with acting on those thoughts. However, he did tell his girlfriend on a phone call that he was going to hang himself. She then hung up and called 911. The patient  was reportedly found sitting on the couch but a broken bucket was found upstairs and it is unknown if the patient tried standing on it in an attempt to actually hang himself. This writer asked the patient if he and his girlfriend were fighting when he told her he was going to hang himself. He answered indecisively with, “Umm. I don't know. Not really. Well… maybe a little.” It was difficult to know if the patient made the statement “I'm going to hang myself” in a threatening manner without intent due to an argument and trying to get her attention or if he had called to actually tell her that he was going to hang himself. The patient's answers did not clarify this and he also refused to give any contact info to ED, such as his girlfriend's number. This writer was able to convince the patient to allow him to speak with his brother who was at the ED with the patient in the waiting area.     Collateral: The patient's brother reports that the patient “has not been himself” for a while now and he seems to be declining quickly. He reports that “all I know is his girlfriend called me saying that he said he was going to hang himself and police said they found him on the couch but there was a broken bucket upstairs that looks like someone tried to stand on it. They don't know if he attempted to hang himself by standing on the bucket or not”. The patient's brother is worried about the patient's wellbeing. He said, “I told him I'm going to a meeting tomorrow morning and I hope he comes with me but I can't make that decision for him”.       The patient scores moderate risk for suicide on the C-SSRS when assessed by EPAT. Since the patient would not give the number of his girlfriend to ED for collateral information, this writer feels that the patient may be at a higher risk for suicide then what he presents himself. Since the whole story was unable to be obtained from the patient's girlfriend's perspective, it is assumed that the  patient had a plan to hang himself and is at risk for suicide. For this reason, the patient is recommended for inpatient psychiatric admission. ED provider agrees with inpatient psychiatric admission.     Diagnostic Impression: Major Depressive Disorder (untreated), Polysubstance Use Disorder (Methamphetamine, Opioids)    Specific Resources Provided to Patient: Inpatient psychiatric admission  CM Notified: N/A  PHP/IOP Recommended: None  Specific Information Provided for PHP/IOP: None  Plan Comments: Patient may benefit from dual treatment    Outcome/Disposition  Patient's Perception of Outcome Achieved: Unable to assess  Assessment, Recommendations and Risk Level Reviewed with: Phoenix Robles MD  Contact Name: Abe  Contact Number(s): [phone]  Contact Relationship: Brother  EPAT Assessment Completed Date: 05/28/24  EPAT Assessment Completed Time: 0024  Patient Disposition: Out of network facility (Specify)  Out of Network Reason:  unit at capacity

## 2025-08-21 ENCOUNTER — HOSPITAL ENCOUNTER (EMERGENCY)
Facility: HOSPITAL | Age: 30
Discharge: HOME | End: 2025-08-21

## 2025-08-21 VITALS
HEIGHT: 66 IN | OXYGEN SATURATION: 98 % | DIASTOLIC BLOOD PRESSURE: 80 MMHG | WEIGHT: 175 LBS | BODY MASS INDEX: 28.12 KG/M2 | TEMPERATURE: 98 F | RESPIRATION RATE: 16 BRPM | HEART RATE: 70 BPM | SYSTOLIC BLOOD PRESSURE: 135 MMHG

## 2025-08-21 DIAGNOSIS — T16.1XXA FOREIGN BODY OF RIGHT EAR, INITIAL ENCOUNTER: Primary | ICD-10-CM

## 2025-08-21 PROCEDURE — 99283 EMERGENCY DEPT VISIT LOW MDM: CPT

## 2025-08-21 PROCEDURE — 69200 CLEAR OUTER EAR CANAL: CPT | Mod: RT

## 2025-08-21 PROCEDURE — 99282 EMERGENCY DEPT VISIT SF MDM: CPT

## 2025-08-21 ASSESSMENT — PAIN SCALES - GENERAL
PAINLEVEL_OUTOF10: 0 - NO PAIN
PAINLEVEL_OUTOF10: 0 - NO PAIN

## 2025-08-21 ASSESSMENT — PAIN - FUNCTIONAL ASSESSMENT
PAIN_FUNCTIONAL_ASSESSMENT: 0-10
PAIN_FUNCTIONAL_ASSESSMENT: 0-10